# Patient Record
Sex: MALE | Race: WHITE | NOT HISPANIC OR LATINO | Employment: FULL TIME | URBAN - METROPOLITAN AREA
[De-identification: names, ages, dates, MRNs, and addresses within clinical notes are randomized per-mention and may not be internally consistent; named-entity substitution may affect disease eponyms.]

---

## 2018-05-04 ENCOUNTER — APPOINTMENT (OUTPATIENT)
Dept: CT IMAGING | Facility: HOSPITAL | Age: 58
End: 2018-05-04

## 2018-05-04 ENCOUNTER — APPOINTMENT (OUTPATIENT)
Dept: GENERAL RADIOLOGY | Facility: HOSPITAL | Age: 58
End: 2018-05-04

## 2018-05-04 ENCOUNTER — HOSPITAL ENCOUNTER (EMERGENCY)
Facility: HOSPITAL | Age: 58
Discharge: HOME | End: 2018-05-04
Attending: EMERGENCY MEDICINE | Admitting: EMERGENCY MEDICINE

## 2018-05-04 VITALS
RESPIRATION RATE: 18 BRPM | HEART RATE: 95 BPM | DIASTOLIC BLOOD PRESSURE: 79 MMHG | OXYGEN SATURATION: 99 % | BODY MASS INDEX: 27.77 KG/M2 | HEIGHT: 72 IN | SYSTOLIC BLOOD PRESSURE: 114 MMHG | WEIGHT: 205 LBS | TEMPERATURE: 98.4 F

## 2018-05-04 DIAGNOSIS — J35.1 ENLARGED TONSILS: ICD-10-CM

## 2018-05-04 DIAGNOSIS — Z86.711 HISTORY OF PULMONARY EMBOLISM: ICD-10-CM

## 2018-05-04 DIAGNOSIS — R07.9 CHEST PAIN, UNSPECIFIED TYPE: Primary | ICD-10-CM

## 2018-05-04 DIAGNOSIS — Z86.718 HISTORY OF DVT (DEEP VEIN THROMBOSIS): ICD-10-CM

## 2018-05-04 LAB
ALBUMIN SERPL-MCNC: 5 G/DL (ref 3.2–4.8)
ALBUMIN/GLOB SERPL: 1.6 G/DL (ref 1.5–2.5)
ALP SERPL-CCNC: 81 U/L (ref 25–100)
ALT SERPL W P-5'-P-CCNC: 39 U/L (ref 7–40)
ANION GAP SERPL CALCULATED.3IONS-SCNC: 12 MMOL/L (ref 3–11)
AST SERPL-CCNC: 30 U/L (ref 0–33)
BASOPHILS # BLD AUTO: 0.02 10*3/MM3 (ref 0–0.2)
BASOPHILS NFR BLD AUTO: 0.2 % (ref 0–1)
BILIRUB SERPL-MCNC: 0.6 MG/DL (ref 0.3–1.2)
BNP SERPL-MCNC: 2 PG/ML (ref 0–100)
BUN BLD-MCNC: 14 MG/DL (ref 9–23)
BUN/CREAT SERPL: 15.6 (ref 7–25)
CALCIUM SPEC-SCNC: 9.8 MG/DL (ref 8.7–10.4)
CHLORIDE SERPL-SCNC: 103 MMOL/L (ref 99–109)
CO2 SERPL-SCNC: 25 MMOL/L (ref 20–31)
CREAT BLD-MCNC: 0.9 MG/DL (ref 0.6–1.3)
DEPRECATED RDW RBC AUTO: 48.6 FL (ref 37–54)
EOSINOPHIL # BLD AUTO: 0.19 10*3/MM3 (ref 0–0.3)
EOSINOPHIL NFR BLD AUTO: 2.2 % (ref 0–3)
ERYTHROCYTE [DISTWIDTH] IN BLOOD BY AUTOMATED COUNT: 13.9 % (ref 11.3–14.5)
GFR SERPL CREATININE-BSD FRML MDRD: 87 ML/MIN/1.73
GLOBULIN UR ELPH-MCNC: 3.2 GM/DL
GLUCOSE BLD-MCNC: 110 MG/DL (ref 70–100)
HCT VFR BLD AUTO: 47.4 % (ref 38.9–50.9)
HGB BLD-MCNC: 16 G/DL (ref 13.1–17.5)
HOLD SPECIMEN: NORMAL
HOLD SPECIMEN: NORMAL
IMM GRANULOCYTES # BLD: 0.01 10*3/MM3 (ref 0–0.03)
IMM GRANULOCYTES NFR BLD: 0.1 % (ref 0–0.6)
LYMPHOCYTES # BLD AUTO: 2.72 10*3/MM3 (ref 0.6–4.8)
LYMPHOCYTES NFR BLD AUTO: 30.9 % (ref 24–44)
MCH RBC QN AUTO: 32.1 PG (ref 27–31)
MCHC RBC AUTO-ENTMCNC: 33.8 G/DL (ref 32–36)
MCV RBC AUTO: 95.2 FL (ref 80–99)
MONOCYTES # BLD AUTO: 0.72 10*3/MM3 (ref 0–1)
MONOCYTES NFR BLD AUTO: 8.2 % (ref 0–12)
NEUTROPHILS # BLD AUTO: 5.15 10*3/MM3 (ref 1.5–8.3)
NEUTROPHILS NFR BLD AUTO: 58.5 % (ref 41–71)
PLATELET # BLD AUTO: 241 10*3/MM3 (ref 150–450)
PMV BLD AUTO: 10.7 FL (ref 6–12)
POTASSIUM BLD-SCNC: 3.8 MMOL/L (ref 3.5–5.5)
PROT SERPL-MCNC: 8.2 G/DL (ref 5.7–8.2)
RBC # BLD AUTO: 4.98 10*6/MM3 (ref 4.2–5.76)
SODIUM BLD-SCNC: 140 MMOL/L (ref 132–146)
TROPONIN I SERPL-MCNC: 0 NG/ML (ref 0–0.07)
TROPONIN I SERPL-MCNC: 0 NG/ML (ref 0–0.07)
WBC NRBC COR # BLD: 8.8 10*3/MM3 (ref 3.5–10.8)
WHOLE BLOOD HOLD SPECIMEN: NORMAL
WHOLE BLOOD HOLD SPECIMEN: NORMAL

## 2018-05-04 PROCEDURE — 70491 CT SOFT TISSUE NECK W/DYE: CPT

## 2018-05-04 PROCEDURE — 84484 ASSAY OF TROPONIN QUANT: CPT

## 2018-05-04 PROCEDURE — 93005 ELECTROCARDIOGRAM TRACING: CPT | Performed by: EMERGENCY MEDICINE

## 2018-05-04 PROCEDURE — 96360 HYDRATION IV INFUSION INIT: CPT

## 2018-05-04 PROCEDURE — 93005 ELECTROCARDIOGRAM TRACING: CPT

## 2018-05-04 PROCEDURE — 0 IOPAMIDOL PER 1 ML: Performed by: EMERGENCY MEDICINE

## 2018-05-04 PROCEDURE — 80053 COMPREHEN METABOLIC PANEL: CPT

## 2018-05-04 PROCEDURE — 83880 ASSAY OF NATRIURETIC PEPTIDE: CPT

## 2018-05-04 PROCEDURE — 85025 COMPLETE CBC W/AUTO DIFF WBC: CPT

## 2018-05-04 PROCEDURE — 99283 EMERGENCY DEPT VISIT LOW MDM: CPT

## 2018-05-04 PROCEDURE — 71046 X-RAY EXAM CHEST 2 VIEWS: CPT

## 2018-05-04 PROCEDURE — 71275 CT ANGIOGRAPHY CHEST: CPT

## 2018-05-04 RX ORDER — SODIUM CHLORIDE 0.9 % (FLUSH) 0.9 %
10 SYRINGE (ML) INJECTION AS NEEDED
Status: DISCONTINUED | OUTPATIENT
Start: 2018-05-04 | End: 2018-05-04 | Stop reason: HOSPADM

## 2018-05-04 RX ADMIN — SODIUM CHLORIDE 1000 ML: 9 INJECTION, SOLUTION INTRAVENOUS at 18:45

## 2018-05-04 RX ADMIN — IOPAMIDOL 95 ML: 755 INJECTION, SOLUTION INTRAVENOUS at 19:02

## 2018-05-04 NOTE — ED PROVIDER NOTES
Subjective   Sudden onset of cp and soa. Hx of PEs and DVTs. Most recent PE was 5 years ago. He was on eliquis but took himself off 7 days ago as he thought it was causing his throat to swell. He reports warfarin did the same thing. Currently his throat feels fine. He has an apt with his pcp in the next several weeks for eval.            Chest Pain   Pain location:  Substernal area  Pain quality: sharp    Pain radiates to:  Does not radiate  Pain severity:  Moderate  Onset quality:  Sudden  Duration:  3 hours  Timing:  Constant  Progression:  Waxing and waning  Chronicity:  New  Context: breathing and movement    Relieved by:  Nothing  Ineffective treatments:  None tried  Associated symptoms: shortness of breath    Associated symptoms: no abdominal pain, no cough, no diaphoresis, no fever, no nausea and no vomiting        Review of Systems   Constitutional: Negative for diaphoresis and fever.   HENT: Negative for congestion and sore throat.    Respiratory: Positive for shortness of breath. Negative for cough, choking, chest tightness and wheezing.    Cardiovascular: Positive for chest pain. Negative for leg swelling.   Gastrointestinal: Negative for abdominal distention, abdominal pain, diarrhea, nausea and vomiting.   All other systems reviewed and are negative.      Past Medical History:   Diagnosis Date   • DVT (deep venous thrombosis)    • Kidney stone    • PE (pulmonary thromboembolism)        No Known Allergies    Past Surgical History:   Procedure Laterality Date   • BLADDER SURGERY     • KNEE SURGERY Left        History reviewed. No pertinent family history.    Social History     Social History   • Marital status:      Social History Main Topics   • Smoking status: Never Smoker   • Smokeless tobacco: Never Used   • Alcohol use No   • Drug use: No   • Sexual activity: Defer     Other Topics Concern   • Not on file           Objective   Physical Exam   Constitutional: He is oriented to person, place,  and time. He appears well-developed and well-nourished.   HENT:   Head: Normocephalic and atraumatic.   Right Ear: External ear normal.   Left Ear: External ear normal.   Nose: Nose normal.   Mouth/Throat: Oropharynx is clear and moist.   Eyes: Conjunctivae and EOM are normal. Pupils are equal, round, and reactive to light.   Neck: Normal range of motion. Neck supple.   Cardiovascular: Normal rate, regular rhythm, normal heart sounds and intact distal pulses.    Pulmonary/Chest: Effort normal and breath sounds normal.   Abdominal: Soft. Bowel sounds are normal.   Musculoskeletal: Normal range of motion. He exhibits no edema or tenderness.   Neurological: He is alert and oriented to person, place, and time.   Skin: Skin is warm and dry.   Psychiatric: He has a normal mood and affect. His behavior is normal. Judgment normal.       Procedures           ED Course  ED Course   Comment By Time   Neg CTs for PE or mass. He will restart his anticoagulation. I recommend earlier fu with his pcp and he will need an endoscopy for further eval of his throat problem.    He appears well. No cp or soa. Well aware of the ss of worsening condition. DALE Mendes 05/04 2026                CT Soft Tissue Neck With Contrast   Final Result   Findings may represent lingual tonsillitis. No evidence of palatine    tonsillitis.      No evidence of epiglottitis.      No evidence of retropharyngeal abscess.      THIS DOCUMENT HAS BEEN ELECTRONICALLY SIGNED BY TIM ASHTON MD      CT Angiogram Chest With Contrast   Final Result   No evidence of pulmonary embolism.      No consolidation, evidence of failure or suspicious mass.      Right lower lung atelectasis.      THIS DOCUMENT HAS BEEN ELECTRONICALLY SIGNED BY TIM ASHTON MD      XR Chest 2 View   Preliminary Result   Stable chest exam with hyperinflated lungs and mild chronic   appearing interstitial changes. No acute chest disease is seen.       D:  05/04/2018   E:  05/04/2018                      MDM      Final diagnoses:   Chest pain, unspecified type   Enlarged tonsils   History of pulmonary embolism   History of DVT (deep vein thrombosis)            DALE Mendes  05/04/18 2032       DALE Mendes  05/04/18 2032

## 2020-01-21 ENCOUNTER — HOSPITAL ENCOUNTER (EMERGENCY)
Facility: HOSPITAL | Age: 60
Discharge: HOME | End: 2020-01-21
Attending: EMERGENCY MEDICINE | Admitting: EMERGENCY MEDICINE

## 2020-01-21 VITALS
RESPIRATION RATE: 18 BRPM | SYSTOLIC BLOOD PRESSURE: 127 MMHG | BODY MASS INDEX: 29.12 KG/M2 | HEART RATE: 94 BPM | HEIGHT: 72 IN | OXYGEN SATURATION: 98 % | DIASTOLIC BLOOD PRESSURE: 79 MMHG | WEIGHT: 215 LBS | TEMPERATURE: 97.8 F

## 2020-01-21 DIAGNOSIS — E87.20 LACTIC ACIDOSIS: ICD-10-CM

## 2020-01-21 DIAGNOSIS — R20.2 PARESTHESIAS: Primary | ICD-10-CM

## 2020-01-21 LAB
ALBUMIN SERPL-MCNC: 4.8 G/DL (ref 3.5–5.2)
ALBUMIN/GLOB SERPL: 1.6 G/DL
ALP SERPL-CCNC: 64 U/L (ref 39–117)
ALT SERPL W P-5'-P-CCNC: 18 U/L (ref 1–41)
AMPHET+METHAMPHET UR QL: NEGATIVE
AMPHETAMINES UR QL: NEGATIVE
ANION GAP SERPL CALCULATED.3IONS-SCNC: 16 MMOL/L (ref 5–15)
ARTERIAL PATENCY WRIST A: NORMAL
AST SERPL-CCNC: 17 U/L (ref 1–40)
ATMOSPHERIC PRESS: ABNORMAL MM[HG]
ATMOSPHERIC PRESS: NORMAL MM[HG]
BARBITURATES UR QL SCN: NEGATIVE
BASE EXCESS BLDA CALC-SCNC: 0.6 MMOL/L (ref 0–2)
BASE EXCESS BLDV CALC-SCNC: 1.8 MMOL/L (ref -2–2)
BASOPHILS # BLD AUTO: 0.04 10*3/MM3 (ref 0–0.2)
BASOPHILS NFR BLD AUTO: 0.5 % (ref 0–1.5)
BDY SITE: ABNORMAL
BDY SITE: NORMAL
BENZODIAZ UR QL SCN: NEGATIVE
BILIRUB SERPL-MCNC: 0.5 MG/DL (ref 0.2–1.2)
BODY TEMPERATURE: 37 C
BODY TEMPERATURE: 37 C
BUN BLD-MCNC: 11 MG/DL (ref 8–23)
BUN/CREAT SERPL: 14.9 (ref 7–25)
BUPRENORPHINE SERPL-MCNC: NEGATIVE NG/ML
CALCIUM SPEC-SCNC: 9.8 MG/DL (ref 8.6–10.5)
CANNABINOIDS SERPL QL: NEGATIVE
CHLORIDE SERPL-SCNC: 103 MMOL/L (ref 98–107)
CK SERPL-CCNC: 101 U/L (ref 20–200)
CO2 BLDA-SCNC: 25.9 MMOL/L (ref 22–33)
CO2 BLDA-SCNC: 31.1 MMOL/L (ref 22–33)
CO2 SERPL-SCNC: 26 MMOL/L (ref 22–29)
COCAINE UR QL: NEGATIVE
COHGB MFR BLD: 0.9 %
COHGB MFR BLD: 1.1 % (ref 0–2)
CREAT BLD-MCNC: 0.74 MG/DL (ref 0.76–1.27)
D-LACTATE SERPL-SCNC: 1.4 MMOL/L (ref 0.5–2)
D-LACTATE SERPL-SCNC: 2.9 MMOL/L (ref 0.5–2)
DEPRECATED RDW RBC AUTO: 45.6 FL (ref 37–54)
EOSINOPHIL # BLD AUTO: 0.08 10*3/MM3 (ref 0–0.4)
EOSINOPHIL NFR BLD AUTO: 1.1 % (ref 0.3–6.2)
ERYTHROCYTE [DISTWIDTH] IN BLOOD BY AUTOMATED COUNT: 13.2 % (ref 12.3–15.4)
GFR SERPL CREATININE-BSD FRML MDRD: 108 ML/MIN/1.73
GLOBULIN UR ELPH-MCNC: 3 GM/DL
GLUCOSE BLD-MCNC: 105 MG/DL (ref 65–99)
HCO3 BLDA-SCNC: 24.8 MMOL/L (ref 20–26)
HCO3 BLDV-SCNC: 29.4 MMOL/L (ref 22–28)
HCT VFR BLD AUTO: 46.8 % (ref 37.5–51)
HCT VFR BLD CALC: 46.4 %
HGB BLD-MCNC: 15.6 G/DL (ref 13–17.7)
HGB BLDA-MCNC: 15.1 G/DL (ref 13.5–17.5)
HGB BLDA-MCNC: 16.3 G/DL (ref 13.5–17.5)
HOLD SPECIMEN: NORMAL
HOROWITZ INDEX BLD+IHG-RTO: 21 %
HOROWITZ INDEX BLD+IHG-RTO: 21 %
IMM GRANULOCYTES # BLD AUTO: 0.02 10*3/MM3 (ref 0–0.05)
IMM GRANULOCYTES NFR BLD AUTO: 0.3 % (ref 0–0.5)
LYMPHOCYTES # BLD AUTO: 1.8 10*3/MM3 (ref 0.7–3.1)
LYMPHOCYTES NFR BLD AUTO: 23.7 % (ref 19.6–45.3)
Lab: ABNORMAL
MCH RBC QN AUTO: 31.4 PG (ref 26.6–33)
MCHC RBC AUTO-ENTMCNC: 33.3 G/DL (ref 31.5–35.7)
MCV RBC AUTO: 94.2 FL (ref 79–97)
METHADONE UR QL SCN: NEGATIVE
METHGB BLD QL: 0.9 %
METHGB BLD QL: 1 % (ref 0–1.5)
MODALITY: ABNORMAL
MODALITY: NORMAL
MONOCYTES # BLD AUTO: 0.64 10*3/MM3 (ref 0.1–0.9)
MONOCYTES NFR BLD AUTO: 8.4 % (ref 5–12)
NEUTROPHILS # BLD AUTO: 5.03 10*3/MM3 (ref 1.7–7)
NEUTROPHILS NFR BLD AUTO: 66 % (ref 42.7–76)
NOTE: ABNORMAL
NOTE: NORMAL
NOTIFIED BY: ABNORMAL
NOTIFIED WHO: ABNORMAL
NRBC BLD AUTO-RTO: 0 /100 WBC (ref 0–0.2)
OPIATES UR QL: NEGATIVE
OXYCODONE UR QL SCN: NEGATIVE
OXYHGB MFR BLDV: 27.5 %
OXYHGB MFR BLDV: 95.4 % (ref 94–99)
PCO2 BLDA: 37.7 MM HG
PCO2 BLDV: 56.3 MM HG (ref 41–51)
PCO2 TEMP ADJ BLD: 37.7 MM HG (ref 35–48)
PCP UR QL SCN: NEGATIVE
PH BLDA: 7.43 PH UNITS (ref 7.35–7.45)
PH BLDV: 7.33 PH UNITS
PH, TEMP CORRECTED: 7.43 PH UNITS
PLATELET # BLD AUTO: 234 10*3/MM3 (ref 140–450)
PMV BLD AUTO: 10.2 FL (ref 6–12)
PO2 BLDA: 84 MM HG (ref 83–108)
PO2 BLDV: 18 MM HG (ref 27–53)
PO2 TEMP ADJ BLD: 84 MM HG (ref 83–108)
POTASSIUM BLD-SCNC: 4.3 MMOL/L (ref 3.5–5.2)
PROPOXYPH UR QL: NEGATIVE
PROT SERPL-MCNC: 7.8 G/DL (ref 6–8.5)
RBC # BLD AUTO: 4.97 10*6/MM3 (ref 4.14–5.8)
SODIUM BLD-SCNC: 145 MMOL/L (ref 136–145)
TRICYCLICS UR QL SCN: NEGATIVE
VENTILATOR MODE: ABNORMAL
VENTILATOR MODE: NORMAL
WBC NRBC COR # BLD: 7.61 10*3/MM3 (ref 3.4–10.8)

## 2020-01-21 PROCEDURE — 80053 COMPREHEN METABOLIC PANEL: CPT | Performed by: EMERGENCY MEDICINE

## 2020-01-21 PROCEDURE — 82820 HEMOGLOBIN-OXYGEN AFFINITY: CPT

## 2020-01-21 PROCEDURE — 36600 WITHDRAWAL OF ARTERIAL BLOOD: CPT

## 2020-01-21 PROCEDURE — 82805 BLOOD GASES W/O2 SATURATION: CPT

## 2020-01-21 PROCEDURE — 83605 ASSAY OF LACTIC ACID: CPT | Performed by: EMERGENCY MEDICINE

## 2020-01-21 PROCEDURE — 82550 ASSAY OF CK (CPK): CPT | Performed by: EMERGENCY MEDICINE

## 2020-01-21 PROCEDURE — 99284 EMERGENCY DEPT VISIT MOD MDM: CPT

## 2020-01-21 PROCEDURE — 93005 ELECTROCARDIOGRAM TRACING: CPT | Performed by: EMERGENCY MEDICINE

## 2020-01-21 PROCEDURE — 80306 DRUG TEST PRSMV INSTRMNT: CPT | Performed by: EMERGENCY MEDICINE

## 2020-01-21 PROCEDURE — 85025 COMPLETE CBC W/AUTO DIFF WBC: CPT | Performed by: EMERGENCY MEDICINE

## 2020-01-21 RX ORDER — SODIUM CHLORIDE 0.9 % (FLUSH) 0.9 %
10 SYRINGE (ML) INJECTION AS NEEDED
Status: DISCONTINUED | OUTPATIENT
Start: 2020-01-21 | End: 2020-01-21 | Stop reason: HOSPADM

## 2020-01-21 RX ADMIN — SODIUM CHLORIDE 1000 ML: 9 INJECTION, SOLUTION INTRAVENOUS at 02:29

## 2020-01-21 RX ADMIN — SODIUM CHLORIDE 1000 ML: 9 INJECTION, SOLUTION INTRAVENOUS at 05:25

## 2020-01-21 NOTE — ED PROVIDER NOTES
Subjective   Mr. Alo Yousif is a 59 y.o male presenting to the emergency department with complaints of numbness. He states he has been eating wild elderberries for the past several days. He started having fatigue in his bilateral arms and tingling in his fingers 6 days ago. He describes the sensation as tightness but denies pain. At first he thought he had a virus but his symptoms continued to worsen. The tightness began to move down from his shoulder and elbow to his lower arms and hands. He complains of difficulty typing and poor coordination involving putting his car key in the ignition. He complains of feeling overall jittery and anxious. He called poison control and was recommended to come here. He denies neck pain, diarrhea, vomiting, and numbness or tingling in his legs. There are no other acute symptoms at this time.       History provided by:  Patient  Other   Location:  Bilateral lower arms, hands, and fingers  Quality:  Numbness, tingling, tightness  Severity:  Moderate  Onset quality:  Sudden  Duration:  6 days  Timing:  Constant  Progression:  Worsening  Chronicity:  New  Relieved by:  None tried  Worsened by:  Nothing  Ineffective treatments:  None tried  Associated symptoms: fatigue (in arms)    Associated symptoms: no diarrhea and no vomiting        Review of Systems   Constitutional: Positive for fatigue (in arms).        Jittery   Gastrointestinal: Negative for diarrhea and vomiting.   Musculoskeletal: Negative for neck pain.   Neurological: Positive for numbness.        Numbness and tingling in bilateral lower arms and hands. Poor coordination.   Shoulder tightness and tingling: resolved. Elbow tightness and tingling: resolved.   No numbness or tingling in legs.    Psychiatric/Behavioral: The patient is nervous/anxious.    All other systems reviewed and are negative.      Past Medical History:   Diagnosis Date   • DVT (deep venous thrombosis) (CMS/[facility])    • Kidney stone    • PE (pulmonary  thromboembolism) (CMS/[facility]CC)        No Known Allergies    Past Surgical History:   Procedure Laterality Date   • BLADDER SURGERY     • KNEE SURGERY Left        No family history on file.    Social History     Socioeconomic History   • Marital status:      Spouse name: Not on file   • Number of children: Not on file   • Years of education: Not on file   • Highest education level: Not on file   Tobacco Use   • Smoking status: Never Smoker   • Smokeless tobacco: Never Used   Substance and Sexual Activity   • Alcohol use: No   • Drug use: No   • Sexual activity: Defer         Objective   Physical Exam   Constitutional: He is oriented to person, place, and time. He appears well-developed and well-nourished. No distress.   HENT:   Head: Normocephalic and atraumatic.   Eyes: Conjunctivae and EOM are normal. No scleral icterus.   Neck: Normal range of motion.   Cardiovascular: Regular rhythm. Tachycardia present. Exam reveals no gallop and no friction rub.   No murmur heard.  Pulmonary/Chest: Effort normal and breath sounds normal. No respiratory distress. He has no wheezes.   Abdominal: Soft. There is no tenderness.   Musculoskeletal: Normal range of motion. He exhibits no edema, tenderness or deformity.   Neurological: He is alert and oriented to person, place, and time.   No motor deficits appreciated   Skin: Skin is warm and dry. He is not diaphoretic.   Psychiatric: He has a normal mood and affect. His behavior is normal.   Anxious   Nursing note and vitals reviewed.      Procedures         ED Course     Recent Results (from the past 24 hour(s))   Urine Drug Screen - Urine, Clean Catch    Collection Time: 01/21/20  1:40 AM   Result Value Ref Range    THC, Screen, Urine Negative Negative    Phencyclidine (PCP), Urine Negative Negative    Cocaine Screen, Urine Negative Negative    Methamphetamine, Ur Negative Negative    Opiate Screen Negative Negative    Amphetamine Screen, Urine Negative Negative    Benzodiazepine  Screen, Urine Negative Negative    Tricyclic Antidepressants Screen Negative Negative    Methadone Screen, Urine Negative Negative    Barbiturates Screen, Urine Negative Negative    Oxycodone Screen, Urine Negative Negative    Propoxyphene Screen Negative Negative    Buprenorphine, Screen, Urine Negative Negative   Comprehensive Metabolic Panel    Collection Time: 01/21/20  1:53 AM   Result Value Ref Range    Glucose 105 (H) 65 - 99 mg/dL    BUN 11 8 - 23 mg/dL    Creatinine 0.74 (L) 0.76 - 1.27 mg/dL    Sodium 145 136 - 145 mmol/L    Potassium 4.3 3.5 - 5.2 mmol/L    Chloride 103 98 - 107 mmol/L    CO2 26.0 22.0 - 29.0 mmol/L    Calcium 9.8 8.6 - 10.5 mg/dL    Total Protein 7.8 6.0 - 8.5 g/dL    Albumin 4.80 3.50 - 5.20 g/dL    ALT (SGPT) 18 1 - 41 U/L    AST (SGOT) 17 1 - 40 U/L    Alkaline Phosphatase 64 39 - 117 U/L    Total Bilirubin 0.5 0.2 - 1.2 mg/dL    eGFR Non African Amer 108 >60 mL/min/1.73    Globulin 3.0 gm/dL    A/G Ratio 1.6 g/dL    BUN/Creatinine Ratio 14.9 7.0 - 25.0    Anion Gap 16.0 (H) 5.0 - 15.0 mmol/L   CBC Auto Differential    Collection Time: 01/21/20  1:53 AM   Result Value Ref Range    WBC 7.61 3.40 - 10.80 10*3/mm3    RBC 4.97 4.14 - 5.80 10*6/mm3    Hemoglobin 15.6 13.0 - 17.7 g/dL    Hematocrit 46.8 37.5 - 51.0 %    MCV 94.2 79.0 - 97.0 fL    MCH 31.4 26.6 - 33.0 pg    MCHC 33.3 31.5 - 35.7 g/dL    RDW 13.2 12.3 - 15.4 %    RDW-SD 45.6 37.0 - 54.0 fl    MPV 10.2 6.0 - 12.0 fL    Platelets 234 140 - 450 10*3/mm3    Neutrophil % 66.0 42.7 - 76.0 %    Lymphocyte % 23.7 19.6 - 45.3 %    Monocyte % 8.4 5.0 - 12.0 %    Eosinophil % 1.1 0.3 - 6.2 %    Basophil % 0.5 0.0 - 1.5 %    Immature Grans % 0.3 0.0 - 0.5 %    Neutrophils, Absolute 5.03 1.70 - 7.00 10*3/mm3    Lymphocytes, Absolute 1.80 0.70 - 3.10 10*3/mm3    Monocytes, Absolute 0.64 0.10 - 0.90 10*3/mm3    Eosinophils, Absolute 0.08 0.00 - 0.40 10*3/mm3    Basophils, Absolute 0.04 0.00 - 0.20 10*3/mm3    Immature Grans, Absolute 0.02  0.00 - 0.05 10*3/mm3    nRBC 0.0 0.0 - 0.2 /100 WBC   Lactic Acid, Plasma    Collection Time: 01/21/20  1:53 AM   Result Value Ref Range    Lactate 2.9 (C) 0.5 - 2.0 mmol/L   CK    Collection Time: 01/21/20  1:53 AM   Result Value Ref Range    Creatine Kinase 101 20 - 200 U/L   Blood Gas, Venous With Co-Ox    Collection Time: 01/21/20  1:56 AM   Result Value Ref Range    Site OTHER     pH, Venous 7.326 pH Units    pCO2, Venous 56.3 (H) 41.0 - 51.0 mm Hg    pO2, Venous 18.0 (L) 27.0 - 53.0 mm Hg    HCO3, Venous 29.4 (H) 22.0 - 28.0 mmol/L    Base Excess, Venous 1.8 -2.0 - 2.0 mmol/L    Hemoglobin, Blood Gas 16.3 13.5 - 17.5 g/dL    Oxyhemoglobin Venous 27.5 %    Methemoglobin Venous 0.9 %    Carboxyhemoglobin Venous 0.9 %    CO2 Content 31.1 22 - 33 mmol/L    Temperature 37.0 C    Barometric Pressure for Blood Gas      Modality Room Air     FIO2 21 %    Ventilator Mode       Note VBG RESULT     Notified Who CANDELARIO CARRERO. .     Notified By [id number]     Notified Time 01/21/2020 01:55    Blood Gas, Arterial With Co-Ox    Collection Time: 01/21/20  3:41 AM   Result Value Ref Range    Site Left Radial     Julien's Test N/A     pH, Arterial 7.426 7.350 - 7.450 pH units    pCO2, Arterial 37.7 mm Hg    pO2, Arterial 84.0 83.0 - 108.0 mm Hg    HCO3, Arterial 24.8 20.0 - 26.0 mmol/L    Base Excess, Arterial 0.6 0.0 - 2.0 mmol/L    Hemoglobin, Blood Gas 15.1 13.5 - 17.5 g/dL    Hematocrit, Blood Gas 46.4 %    Oxyhemoglobin 95.4 94 - 99 %    Methemoglobin 1.00 0.00 - 1.50 %    Carboxyhemoglobin 1.1 0 - 2 %    CO2 Content 25.9 22 - 33 mmol/L    Temperature 37.0 C    Barometric Pressure for Blood Gas      Modality Room Air     FIO2 21 %    Ventilator Mode       Note      pH, Temp Corrected 7.426 pH Units    pCO2, Temperature Corrected 37.7 35 - 48 mm Hg    pO2, Temperature Corrected 84.0 83 - 108 mm Hg     Note: In addition to lab results from this visit, the labs listed above may include labs taken at another facility or during a  different encounter within the last 24 hours. Please correlate lab times with ED admission and discharge times for further clarification of the services performed during this visit.    No orders to display     Vitals:    01/21/20 0100 01/21/20 0200 01/21/20 0231 01/21/20 0300   BP: 159/98 150/94 153/82 147/82   BP Location:       Patient Position:       Pulse: 94 111     Resp:       Temp:       TempSrc:       SpO2: 96% 97%     Weight:       Height:         Medications   sodium chloride 0.9 % flush 10 mL (has no administration in time range)   sodium chloride 0.9 % bolus 1,000 mL (1,000 mL Intravenous New Bag 1/21/20 0229)     ECG/EMG Results (last 24 hours)     Procedure Component Value Units Date/Time    ECG 12 Lead [[id number]] Collected:  01/21/20 0140     Updated:  01/21/20 0140        ECG 12 Lead           Pt felt significantly better following hydration.  He is comfortable with DC at this time.  After initial H and P he noted that he has gone from drinking 2 cups of coffee daily to 8-10 as his wife now makes it at home.  I recommneded that he go back to drinking 2 cups of coffee daily and discontinue the use of Elderberries.  He understands the etiology of his symptoms is not clear and he should have a low threshold to return to the ED if symptoms return of other concerns arise.              MDM    Final diagnoses:   Paresthesias   Lactic acidosis       Documentation assistance provided by linus Nguyen.  Information recorded by the scribsneha was done at my direction and has been verified and validated by me.     Sravanthi Nguyen  01/21/20 0221       Sravanthi Nguyen  01/21/20 0401       Sravanthi Nguyen  01/21/20 0402       Rashawn Brunner DO  01/25/20 0027

## 2020-04-08 ENCOUNTER — TELEMEDICINE (OUTPATIENT)
Dept: NEUROLOGY | Facility: CLINIC | Age: 60
End: 2020-04-08

## 2020-04-08 DIAGNOSIS — R20.2 PARESTHESIAS: Primary | ICD-10-CM

## 2020-04-08 PROCEDURE — 99203 OFFICE O/P NEW LOW 30 MIN: CPT | Performed by: PSYCHIATRY & NEUROLOGY

## 2020-04-08 NOTE — PROGRESS NOTES
Subjective:    CC: Alo Yousif is seen today in consultation for paresthesias in both the hands.    HPI:  This visit was done through video.  Patient is a 59-year-old male with past medical history of hypertension, DVT/PE started having bilateral hand and arm numbness associated with mild weakness and tremors of both hands back in January 2020.  He reports that prior to starting the symptoms, he was eating elderberry for approximately 6 days in his breakfast.  Initially it was thought to be related to the consumption of elderberry and he was instructed to go to ER for further evaluation.  While in ER, he underwent basic lab work which was essentially unremarkable.  He was treated with IV hydration which improved his symptoms and then was eventually discharged home with instruction to follow-up with outpatient neurology.  He reports that since the ER visit, symptoms have become less intense and less severe but it is still present.  He reports that he has stayed in an apartment for about 20 years where he was working from home and he would spend most of his time in his bedroom.  He is concerned that it may be related to some environmental exposure causing this ongoing symptoms.  He denies seeing any visible mold in his apartment.  Currently he has moved out of his apartment and is living with his son.  He denies any weakness in both his hands.  He reports that intermittently, he has experienced some tingling, numbness in both his feet as well.  He has been tested for diabetes and it was negative.  He denies any low back pain or neck pain.  He reports that he has been taking magnesium for some time now and it has helped improve some of his symptoms.  He also takes vitamin B12 1000 mcg daily.      The following portions of the patient's history were reviewed today and updated as of 04/08/2020  : allergies, social history and problem list.  This document will be scanned to patient's chart.      Current Outpatient  Medications:   •  apixaban (ELIQUIS) 5 MG tablet tablet, Take  by mouth 2 (Two) Times a Day. PT ELECTED TO STOP ELIQUIS 7 DAYS AGO WITHOUT CONSULTING PHYSICIAN, Disp: , Rfl:    Past Medical History:   Diagnosis Date   • DVT (deep venous thrombosis) (CMS/HCC)    • Kidney stone    • PE (pulmonary thromboembolism) (CMS/HCC)       Past Surgical History:   Procedure Laterality Date   • BLADDER SURGERY     • KNEE SURGERY Left       No family history on file.   Review of Systems   All other systems reviewed and are negative.      All other systems reviewed and are negative     Objective:    There were no vitals taken for this visit.    Neurology Exam:  General apperance: NAD.     Mental status: Alert, awake and oriented to time place and person.    Recent and Remote memory: Can recall 3/3 objects at 5 minutes. Can recall historical events.     Attention span and Concentration: Serial 7s: Normal.     Fund of knowledge:  Normal.     Language and Speech: No aphasia or dysarthria.    Naming , Repitition and Comprehension:  Can name objects, repeat a sentence and follow commands. Speech is clear and fluent with good repetition, comprehension, and naming.    Cranial Nerves:   Cranial nerves II to XII grossly intact.    Motor:  Moves all 4 extremities spontaneously and denies any weakness.    Sensory: Unable to assess.    DTRs: Unable to assess.    Babinski: Unable to assess.    Co-ordination: Normal finger-to-nose.    Rhomberg: Negative.    Gait: Normal.    Cardiovascular: Unable to assess.    Ophthalmoscopic exam: Unable to assess.  Assessment and Plan:  1. Paresthesias  Possible neuropathy based on patient's symptoms.  I am going to schedule him for EMG/nerve conduction study for further evaluation.  He will need thorough neurological examination once I am able to see him in clinic in person for further evaluation as well.  If EMG/nerve conduction studies positive for neuropathy then plan is to do detailed neuropathy work-up.   He is concerned about possible environmental exposure causing his ongoing symptoms so I advised him that it is better for him to move out of his current apartment and moving to a new apartment.  Continue with magnesium and vitamin B12 as it has helped somewhat.  I plan to see him back in clinic in 8 weeks for further evaluation and to go over the results of EMG/nerve conduction test.      This visit was completed  Through video and total time spent was 30 minutes.    No follow-ups on file.     Johann Kramer MD

## 2020-04-26 ENCOUNTER — APPOINTMENT (OUTPATIENT)
Dept: CT IMAGING | Facility: HOSPITAL | Age: 60
End: 2020-04-26

## 2020-04-26 ENCOUNTER — HOSPITAL ENCOUNTER (EMERGENCY)
Facility: HOSPITAL | Age: 60
Discharge: HOME | End: 2020-04-26
Attending: EMERGENCY MEDICINE | Admitting: EMERGENCY MEDICINE

## 2020-04-26 VITALS
OXYGEN SATURATION: 97 % | SYSTOLIC BLOOD PRESSURE: 124 MMHG | DIASTOLIC BLOOD PRESSURE: 81 MMHG | HEIGHT: 72 IN | HEART RATE: 87 BPM | TEMPERATURE: 99.7 F | BODY MASS INDEX: 26.01 KG/M2 | RESPIRATION RATE: 14 BRPM | WEIGHT: 192 LBS

## 2020-04-26 DIAGNOSIS — Z20.822 SUSPECTED COVID-19 VIRUS INFECTION: ICD-10-CM

## 2020-04-26 DIAGNOSIS — R00.0 INAPPROPRIATE SINUS TACHYCARDIA: ICD-10-CM

## 2020-04-26 DIAGNOSIS — R06.00 DYSPNEA, UNSPECIFIED TYPE: Primary | ICD-10-CM

## 2020-04-26 LAB
ALBUMIN SERPL-MCNC: 4.7 G/DL (ref 3.5–5.2)
ALBUMIN/GLOB SERPL: 1.5 G/DL
ALP SERPL-CCNC: 61 U/L (ref 39–117)
ALT SERPL W P-5'-P-CCNC: 29 U/L (ref 1–41)
ANION GAP SERPL CALCULATED.3IONS-SCNC: 14 MMOL/L (ref 5–15)
AST SERPL-CCNC: 36 U/L (ref 1–40)
B PARAPERT DNA SPEC QL NAA+PROBE: NOT DETECTED
B PERT DNA SPEC QL NAA+PROBE: NOT DETECTED
BASOPHILS # BLD AUTO: 0.03 10*3/MM3 (ref 0–0.2)
BASOPHILS NFR BLD AUTO: 0.4 % (ref 0–1.5)
BILIRUB SERPL-MCNC: 0.7 MG/DL (ref 0.2–1.2)
BUN BLD-MCNC: 8 MG/DL (ref 8–23)
BUN/CREAT SERPL: 10.1 (ref 7–25)
C PNEUM DNA NPH QL NAA+NON-PROBE: NOT DETECTED
CALCIUM SPEC-SCNC: 9.7 MG/DL (ref 8.6–10.5)
CHLORIDE SERPL-SCNC: 101 MMOL/L (ref 98–107)
CO2 SERPL-SCNC: 25 MMOL/L (ref 22–29)
CREAT BLD-MCNC: 0.79 MG/DL (ref 0.76–1.27)
D DIMER PPP FEU-MCNC: 0.36 MCGFEU/ML (ref 0–0.56)
D-LACTATE SERPL-SCNC: 1.3 MMOL/L (ref 0.5–2)
DEPRECATED RDW RBC AUTO: 47.5 FL (ref 37–54)
EOSINOPHIL # BLD AUTO: 0.1 10*3/MM3 (ref 0–0.4)
EOSINOPHIL NFR BLD AUTO: 1.4 % (ref 0.3–6.2)
ERYTHROCYTE [DISTWIDTH] IN BLOOD BY AUTOMATED COUNT: 13.6 % (ref 12.3–15.4)
ERYTHROCYTE [SEDIMENTATION RATE] IN BLOOD: 9 MM/HR (ref 0–20)
FERRITIN SERPL-MCNC: 418.7 NG/ML (ref 30–400)
FLUAV H1 2009 PAND RNA NPH QL NAA+PROBE: NOT DETECTED
FLUAV H1 HA GENE NPH QL NAA+PROBE: NOT DETECTED
FLUAV H3 RNA NPH QL NAA+PROBE: NOT DETECTED
FLUAV SUBTYP SPEC NAA+PROBE: NOT DETECTED
FLUBV RNA ISLT QL NAA+PROBE: NOT DETECTED
GFR SERPL CREATININE-BSD FRML MDRD: 100 ML/MIN/1.73
GLOBULIN UR ELPH-MCNC: 3.2 GM/DL
GLUCOSE BLD-MCNC: 116 MG/DL (ref 65–99)
HADV DNA SPEC NAA+PROBE: NOT DETECTED
HCOV 229E RNA SPEC QL NAA+PROBE: NOT DETECTED
HCOV HKU1 RNA SPEC QL NAA+PROBE: NOT DETECTED
HCOV NL63 RNA SPEC QL NAA+PROBE: NOT DETECTED
HCOV OC43 RNA SPEC QL NAA+PROBE: NOT DETECTED
HCT VFR BLD AUTO: 48.8 % (ref 37.5–51)
HGB BLD-MCNC: 16.6 G/DL (ref 13–17.7)
HMPV RNA NPH QL NAA+NON-PROBE: NOT DETECTED
HOLD SPECIMEN: NORMAL
HOLD SPECIMEN: NORMAL
HPIV1 RNA SPEC QL NAA+PROBE: NOT DETECTED
HPIV2 RNA SPEC QL NAA+PROBE: NOT DETECTED
HPIV3 RNA NPH QL NAA+PROBE: NOT DETECTED
HPIV4 P GENE NPH QL NAA+PROBE: NOT DETECTED
IMM GRANULOCYTES # BLD AUTO: 0.02 10*3/MM3 (ref 0–0.05)
IMM GRANULOCYTES NFR BLD AUTO: 0.3 % (ref 0–0.5)
LDH SERPL-CCNC: 209 U/L (ref 135–225)
LYMPHOCYTES # BLD AUTO: 1.82 10*3/MM3 (ref 0.7–3.1)
LYMPHOCYTES NFR BLD AUTO: 26.1 % (ref 19.6–45.3)
M PNEUMO IGG SER IA-ACNC: NOT DETECTED
MAGNESIUM SERPL-MCNC: 2.3 MG/DL (ref 1.6–2.4)
MCH RBC QN AUTO: 32.1 PG (ref 26.6–33)
MCHC RBC AUTO-ENTMCNC: 34 G/DL (ref 31.5–35.7)
MCV RBC AUTO: 94.4 FL (ref 79–97)
MONOCYTES # BLD AUTO: 0.46 10*3/MM3 (ref 0.1–0.9)
MONOCYTES NFR BLD AUTO: 6.6 % (ref 5–12)
NEUTROPHILS # BLD AUTO: 4.54 10*3/MM3 (ref 1.7–7)
NEUTROPHILS NFR BLD AUTO: 65.2 % (ref 42.7–76)
NRBC BLD AUTO-RTO: 0 /100 WBC (ref 0–0.2)
NT-PROBNP SERPL-MCNC: 48.5 PG/ML (ref 5–900)
PLATELET # BLD AUTO: 239 10*3/MM3 (ref 140–450)
PMV BLD AUTO: 10.8 FL (ref 6–12)
POTASSIUM BLD-SCNC: 3.9 MMOL/L (ref 3.5–5.2)
PROT SERPL-MCNC: 7.9 G/DL (ref 6–8.5)
RBC # BLD AUTO: 5.17 10*6/MM3 (ref 4.14–5.8)
RHINOVIRUS RNA SPEC NAA+PROBE: NOT DETECTED
RSV RNA NPH QL NAA+NON-PROBE: NOT DETECTED
SODIUM BLD-SCNC: 140 MMOL/L (ref 136–145)
TROPONIN T SERPL-MCNC: <0.01 NG/ML (ref 0–0.03)
TSH SERPL DL<=0.05 MIU/L-ACNC: 2.43 UIU/ML (ref 0.27–4.2)
WBC NRBC COR # BLD: 6.97 10*3/MM3 (ref 3.4–10.8)
WHOLE BLOOD HOLD SPECIMEN: NORMAL
WHOLE BLOOD HOLD SPECIMEN: NORMAL

## 2020-04-26 PROCEDURE — 83880 ASSAY OF NATRIURETIC PEPTIDE: CPT | Performed by: EMERGENCY MEDICINE

## 2020-04-26 PROCEDURE — 85652 RBC SED RATE AUTOMATED: CPT | Performed by: EMERGENCY MEDICINE

## 2020-04-26 PROCEDURE — U0003 INFECTIOUS AGENT DETECTION BY NUCLEIC ACID (DNA OR RNA); SEVERE ACUTE RESPIRATORY SYNDROME CORONAVIRUS 2 (SARS-COV-2) (CORONAVIRUS DISEASE [COVID-19]), AMPLIFIED PROBE TECHNIQUE, MAKING USE OF HIGH THROUGHPUT TECHNOLOGIES AS DESCRIBED BY CMS-2020-01-R: HCPCS | Performed by: EMERGENCY MEDICINE

## 2020-04-26 PROCEDURE — U0002 COVID-19 LAB TEST NON-CDC: HCPCS | Performed by: EMERGENCY MEDICINE

## 2020-04-26 PROCEDURE — 96361 HYDRATE IV INFUSION ADD-ON: CPT

## 2020-04-26 PROCEDURE — 83615 LACTATE (LD) (LDH) ENZYME: CPT | Performed by: EMERGENCY MEDICINE

## 2020-04-26 PROCEDURE — 93005 ELECTROCARDIOGRAM TRACING: CPT | Performed by: EMERGENCY MEDICINE

## 2020-04-26 PROCEDURE — 0 IOPAMIDOL PER 1 ML: Performed by: EMERGENCY MEDICINE

## 2020-04-26 PROCEDURE — 0100U HC BIOFIRE FILMARRAY RESP PANEL 2: CPT | Performed by: EMERGENCY MEDICINE

## 2020-04-26 PROCEDURE — 83735 ASSAY OF MAGNESIUM: CPT | Performed by: EMERGENCY MEDICINE

## 2020-04-26 PROCEDURE — 80053 COMPREHEN METABOLIC PANEL: CPT | Performed by: EMERGENCY MEDICINE

## 2020-04-26 PROCEDURE — 84443 ASSAY THYROID STIM HORMONE: CPT | Performed by: EMERGENCY MEDICINE

## 2020-04-26 PROCEDURE — 83605 ASSAY OF LACTIC ACID: CPT | Performed by: EMERGENCY MEDICINE

## 2020-04-26 PROCEDURE — 82728 ASSAY OF FERRITIN: CPT | Performed by: EMERGENCY MEDICINE

## 2020-04-26 PROCEDURE — 84484 ASSAY OF TROPONIN QUANT: CPT | Performed by: EMERGENCY MEDICINE

## 2020-04-26 PROCEDURE — 71275 CT ANGIOGRAPHY CHEST: CPT

## 2020-04-26 PROCEDURE — 85379 FIBRIN DEGRADATION QUANT: CPT | Performed by: EMERGENCY MEDICINE

## 2020-04-26 PROCEDURE — 85025 COMPLETE CBC W/AUTO DIFF WBC: CPT | Performed by: EMERGENCY MEDICINE

## 2020-04-26 PROCEDURE — 96374 THER/PROPH/DIAG INJ IV PUSH: CPT

## 2020-04-26 PROCEDURE — 99284 EMERGENCY DEPT VISIT MOD MDM: CPT

## 2020-04-26 RX ORDER — METOPROLOL SUCCINATE 50 MG/1
50 TABLET, EXTENDED RELEASE ORAL DAILY
Qty: 30 TABLET | Refills: 0 | Status: SHIPPED | OUTPATIENT
Start: 2020-04-26

## 2020-04-26 RX ORDER — SODIUM CHLORIDE 0.9 % (FLUSH) 0.9 %
10 SYRINGE (ML) INJECTION AS NEEDED
Status: DISCONTINUED | OUTPATIENT
Start: 2020-04-26 | End: 2020-04-26 | Stop reason: HOSPADM

## 2020-04-26 RX ORDER — METOPROLOL TARTRATE 5 MG/5ML
2.5 INJECTION INTRAVENOUS
Status: DISCONTINUED | OUTPATIENT
Start: 2020-04-26 | End: 2020-04-26 | Stop reason: HOSPADM

## 2020-04-26 RX ADMIN — IOPAMIDOL 95 ML: 755 INJECTION, SOLUTION INTRAVENOUS at 11:25

## 2020-04-26 RX ADMIN — SODIUM CHLORIDE 2613 ML: 9 INJECTION, SOLUTION INTRAVENOUS at 10:48

## 2020-04-26 RX ADMIN — METOPROLOL TARTRATE 2.5 MG: 5 INJECTION INTRAVENOUS at 10:47

## 2020-04-26 NOTE — ED PROVIDER NOTES
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  He sees Jorge Goncalves is his primary care doctor.  Currently takes no prescription medications and just takes some health food supplements with tumor rec and cezar.    Comes the emergency room today with dyspnea and feeling like he cannot take a deep breath in.  This is been present for 3 weeks and getting worse.  About 2 to 3 weeks ago he was able to walk 10 miles without difficulty a few days ago is able to walk 5 miles but it was more difficult for him over the past couple days just at rest he feels like he cannot take a deep breath in and feels dyspneic.    Only time he is ever felt this way in the past was when he was on sertraline.  He stopped that and his symptoms improved.    He had pulmonary embolus in 2013 and has not been tested for clotting disorders and it was presumed it was due to 1 of multiple right knee surgeries he had.  He took himself off this in February because he was feeling fatigued and he felt better.  He was also have paresthesias in his arms that time was seen here in the ER.  His ER evaluation was negative and he followed up with Dr. Rodriguez via teleconference and is scheduled for NCV's and EMGs which have not been performed yet.    He does check his pulse and he is noticed that is been elevated recently.  He has had no history of tacky arrhythmias that he knows of but did had a Holter a few years ago that was okay as far as he remembers.  He may have had an echo at that time to was presumptively diagnosed with a coxsackie infection though it does not sound like he had a depressed ejection fraction.  He has no history of coronary artery disease.    He has had about a 20 pound weight loss in the past few months but he reports he really has been watching what he eats.    He has sleep apnea and is quite rigorous in the use of his CPAP.    He has had no  peripheral edema.  He has had occasional cough.  Reports no fevers or chills.  He has had no peripheral edema or rash.  Bowel movements and urination have been normal and he is not passed blood per any orifice.    All other systems are reviewed and are negative except as noted above.          Review of Systems   All other systems reviewed and are negative.      Past Medical History:   Diagnosis Date   • DVT (deep venous thrombosis) (CMS/HCC)    • Kidney stone    • PE (pulmonary thromboembolism) (CMS/[facility])        No Known Allergies    Past Surgical History:   Procedure Laterality Date   • BLADDER SURGERY     • KNEE SURGERY Left        History reviewed. No pertinent family history.    Social History     Socioeconomic History   • Marital status:      Spouse name: Not on file   • Number of children: Not on file   • Years of education: Not on file   • Highest education level: Not on file   Tobacco Use   • Smoking status: Never Smoker   • Smokeless tobacco: Never Used   Substance and Sexual Activity   • Alcohol use: No   • Drug use: No   • Sexual activity: Defer           Objective   Physical Exam   Constitutional: He appears well-developed and well-nourished.   This is a middle-aged man in no acute distress.  He has little bit of tachypnea and is little tachycardic his oxygen saturation is 99 100% on room air.  He is in no distress.  In speaking complete sentences.   HENT:   Head: Normocephalic and atraumatic.   Right Ear: External ear normal.   Left Ear: External ear normal.   Nose: Nose normal.   Mouth/Throat: Oropharynx is clear and moist.   Eyes: Pupils are equal, round, and reactive to light. Conjunctivae and EOM are normal.   Neck: Normal range of motion. Neck supple. No JVD present. No tracheal deviation present. No thyromegaly present.   Cardiovascular:   No murmur heard.  Tachycardic but regular without murmurs rubs gallops or heaves.  Axilla has no masses.   Pulmonary/Chest: Effort normal and breath  sounds normal.   Mild tachypnea but I do not hear wheezes or crackles.   Abdominal: Soft. Bowel sounds are normal. He exhibits no distension and no mass. There is no tenderness. There is no guarding.   Musculoskeletal: Normal range of motion. He exhibits no edema.   Is got good range of motion of all his extremities.  Is got no synovitis, rash, or edema.  Got good pulses in his extremities.  There are no venous cords.   Lymphadenopathy:     He has no cervical adenopathy.   Neurological:   He is alert and oriented no acute distress.  His face is symmetric, his voice is strong, his tongue is midline.  Vision, hearing, and speech are all preserved.  He has good motor strength in his knee jerk reflexes are 2+.  He no longer has paresthesias in his upper extremities.   Skin: Skin is warm and dry. Capillary refill takes less than 2 seconds.   Psychiatric: He has a normal mood and affect. His behavior is normal. Judgment and thought content normal.   Nursing note and vitals reviewed.      Procedures           ED Course      Recent Results (from the past 24 hour(s))   Comprehensive Metabolic Panel    Collection Time: 04/26/20 10:14 AM   Result Value Ref Range    Glucose 116 (H) 65 - 99 mg/dL    BUN 8 8 - 23 mg/dL    Creatinine 0.79 0.76 - 1.27 mg/dL    Sodium 140 136 - 145 mmol/L    Potassium 3.9 3.5 - 5.2 mmol/L    Chloride 101 98 - 107 mmol/L    CO2 25.0 22.0 - 29.0 mmol/L    Calcium 9.7 8.6 - 10.5 mg/dL    Total Protein 7.9 6.0 - 8.5 g/dL    Albumin 4.70 3.50 - 5.20 g/dL    ALT (SGPT) 29 1 - 41 U/L    AST (SGOT) 36 1 - 40 U/L    Alkaline Phosphatase 61 39 - 117 U/L    Total Bilirubin 0.7 0.2 - 1.2 mg/dL    eGFR Non African Amer 100 >60 mL/min/1.73    Globulin 3.2 gm/dL    A/G Ratio 1.5 g/dL    BUN/Creatinine Ratio 10.1 7.0 - 25.0    Anion Gap 14.0 5.0 - 15.0 mmol/L   BNP    Collection Time: 04/26/20 10:14 AM   Result Value Ref Range    proBNP 48.5 5.0 - 900.0 pg/mL   Troponin    Collection Time: 04/26/20 10:14 AM    Result Value Ref Range    Troponin T <0.010 0.000 - 0.030 ng/mL   Light Blue Top    Collection Time: 04/26/20 10:14 AM   Result Value Ref Range    Extra Tube     Green Top (Gel)    Collection Time: 04/26/20 10:14 AM   Result Value Ref Range    Extra Tube Hold for add-ons.    Lavender Top    Collection Time: 04/26/20 10:14 AM   Result Value Ref Range    Extra Tube hold for add-on    Gold Top - SST    Collection Time: 04/26/20 10:14 AM   Result Value Ref Range    Extra Tube Hold for add-ons.    CBC Auto Differential    Collection Time: 04/26/20 10:14 AM   Result Value Ref Range    WBC 6.97 3.40 - 10.80 10*3/mm3    RBC 5.17 4.14 - 5.80 10*6/mm3    Hemoglobin 16.6 13.0 - 17.7 g/dL    Hematocrit 48.8 37.5 - 51.0 %    MCV 94.4 79.0 - 97.0 fL    MCH 32.1 26.6 - 33.0 pg    MCHC 34.0 31.5 - 35.7 g/dL    RDW 13.6 12.3 - 15.4 %    RDW-SD 47.5 37.0 - 54.0 fl    MPV 10.8 6.0 - 12.0 fL    Platelets 239 140 - 450 10*3/mm3    Neutrophil % 65.2 42.7 - 76.0 %    Lymphocyte % 26.1 19.6 - 45.3 %    Monocyte % 6.6 5.0 - 12.0 %    Eosinophil % 1.4 0.3 - 6.2 %    Basophil % 0.4 0.0 - 1.5 %    Immature Grans % 0.3 0.0 - 0.5 %    Neutrophils, Absolute 4.54 1.70 - 7.00 10*3/mm3    Lymphocytes, Absolute 1.82 0.70 - 3.10 10*3/mm3    Monocytes, Absolute 0.46 0.10 - 0.90 10*3/mm3    Eosinophils, Absolute 0.10 0.00 - 0.40 10*3/mm3    Basophils, Absolute 0.03 0.00 - 0.20 10*3/mm3    Immature Grans, Absolute 0.02 0.00 - 0.05 10*3/mm3    nRBC 0.0 0.0 - 0.2 /100 WBC   D-dimer, Quantitative    Collection Time: 04/26/20 10:14 AM   Result Value Ref Range    D-Dimer, Quantitative 0.36 0.00 - 0.56 MCGFEU/mL   TSH    Collection Time: 04/26/20 10:14 AM   Result Value Ref Range    TSH 2.430 0.270 - 4.200 uIU/mL   Magnesium    Collection Time: 04/26/20 10:14 AM   Result Value Ref Range    Magnesium 2.3 1.6 - 2.4 mg/dL   Lactate Dehydrogenase    Collection Time: 04/26/20 10:14 AM   Result Value Ref Range     135 - 225 U/L   Ferritin    Collection  Time: 04/26/20 10:14 AM   Result Value Ref Range    Ferritin 418.70 (H) 30.00 - 400.00 ng/mL   Sedimentation Rate    Collection Time: 04/26/20 10:14 AM   Result Value Ref Range    Sed Rate 9 0 - 20 mm/hr   Respiratory Panel, PCR - Swab, Nasopharynx    Collection Time: 04/26/20 10:51 AM   Result Value Ref Range    ADENOVIRUS, PCR Not Detected Not Detected    Coronavirus 229E Not Detected Not Detected    Coronavirus HKU1 Not Detected Not Detected    Coronavirus NL63 Not Detected Not Detected    Coronavirus OC43 Not Detected Not Detected    Human Metapneumovirus Not Detected Not Detected    Human Rhinovirus/Enterovirus Not Detected Not Detected    Influenza B PCR Not Detected Not Detected    Parainfluenza Virus 1 Not Detected Not Detected    Parainfluenza Virus 2 Not Detected Not Detected    Parainfluenza Virus 3 Not Detected Not Detected    Parainfluenza Virus 4 Not Detected Not Detected    Bordetella pertussis pcr Not Detected Not Detected    Influenza A H1 2009 PCR Not Detected Not Detected    Chlamydophila pneumoniae PCR Not Detected Not Detected    Mycoplasma pneumo by PCR Not Detected Not Detected    Influenza A PCR Not Detected Not Detected    Influenza A H3 Not Detected Not Detected    Influenza A H1 Not Detected Not Detected    RSV, PCR Not Detected Not Detected    Bordetella parapertussis PCR Not Detected Not Detected   Lactic Acid, Plasma    Collection Time: 04/26/20 10:51 AM   Result Value Ref Range    Lactate 1.3 0.5 - 2.0 mmol/L     Note: In addition to lab results from this visit, the labs listed above may include labs taken at another facility or during a different encounter within the last 24 hours. Please correlate lab times with ED admission and discharge times for further clarification of the services performed during this visit.    CT Angiogram Chest   Preliminary Result   1. No PE.   2. No acute intrathoracic process. Negative for pneumonia.       DICTATED:   04/26/2020   EDITED/ls :   04/26/2020              Vitals:    04/26/20 1145 04/26/20 1200 04/26/20 1214 04/26/20 1230   BP:  142/91  126/86   BP Location:  Right arm  Right arm   Patient Position:    Lying   Pulse: 85 85 96 96   Resp:  16  16   Temp:       TempSrc:       SpO2: 99% 97% 97% 100%   Weight:       Height:         Medications   sodium chloride 0.9 % flush 10 mL (has no administration in time range)   metoprolol tartrate (LOPRESSOR) injection 2.5 mg (2.5 mg Intravenous Given 4/26/20 1047)   sodium chloride 0.9 % bolus 2,613 mL (2,613 mL Intravenous New Bag 4/26/20 1048)   iopamidol (ISOVUE-370) 76 % injection 95 mL (95 mL Intravenous Given 4/26/20 1125)     ECG/EMG Results (last 24 hours)     Procedure Component Value Units Date/Time    ECG 12 Lead [[id number]] Collected:  04/26/20 1006     Updated:  04/26/20 1007        ECG 12 Lead   Final Result   Test Reason : SOA Protocol   Blood Pressure : **/** mmHG   Vent. Rate : 116 BPM     Atrial Rate : 116 BPM      P-R Int : 136 ms          QRS Dur : 088 ms       QT Int : 320 ms       P-R-T Axes : 075 006 067 degrees      QTc Int : 444 ms      Sinus tachycardia   Otherwise normal ECG   When compared with ECG of 21-JAN-2020 01:40,   No significant change was found   Confirmed by SUSANA LOZADA MD ([id number]) on 4/26/2020 12:36:57 PM      Referred By:  KIERRA           Confirmed By:SUSANA LOZADA MD                                             MDM  Number of Diagnoses or Management Options  Dyspnea, unspecified type:   Inappropriate sinus tachycardia:   Suspected Covid-19 Virus Infection:   Diagnosis management comments:       I reviewed all available studies at the bedside with the patient.  His labs are actually quite reassuring.  His d-dimer is normal.  His ferritin is just slightly elevated.  His thyroid functions and cardiac markers and other chemistries are all reassuring.    The CTA of his chest is also reassuring.  His cardiac silhouette appears normal.  He has no pulmonary embolus no  infiltrates.    In the ER give the patient some IV Lopressor and his heart rate was in the lower 90s.  He really did not feel significantly different with this.  I think I can think that he has some inappropriate sinus tachycardia.  He is never been diagnosed with this in the past and has no history of arrhythmia.  He is reasonable to start him on low-dose beta-blocker and refer him to the arrhythmia clinic as I think he may need further evaluation including Holter and perhaps echo.  I did send a Coban test on the patient and this is pending though I think his risk of COVID is quite low given the paucity of other findings.    Also have him follow-up with his primary care doctor to help coordinate his care.  He will return to the ER if worse in any way.    All are agreeable with the plan       Amount and/or Complexity of Data Reviewed  Clinical lab tests: reviewed  Tests in the radiology section of CPT®: reviewed  Tests in the medicine section of CPT®: reviewed        Final diagnoses:   Dyspnea, unspecified type   Inappropriate sinus tachycardia   Suspected Covid-19 Virus Infection            Alo Alaniz MD  04/26/20 1300

## 2020-04-27 ENCOUNTER — EPISODE CHANGES (OUTPATIENT)
Dept: CASE MANAGEMENT | Facility: OTHER | Age: 60
End: 2020-04-27

## 2020-04-27 LAB
REF LAB TEST METHOD: NORMAL
SARS-COV-2 RNA RESP QL NAA+PROBE: NOT DETECTED

## 2020-04-29 ENCOUNTER — TELEPHONE (OUTPATIENT)
Dept: CARDIOLOGY | Facility: HOSPITAL | Age: 60
End: 2020-04-29

## 2020-04-29 NOTE — TELEPHONE ENCOUNTER
Patient was referred to Heart and Vascular by [facility] ER. Several attempts have been made to contact pt with no success. Letter was sent to patient to call the office to schedule.

## 2020-05-03 ENCOUNTER — EPISODE CHANGES (OUTPATIENT)
Dept: CASE MANAGEMENT | Facility: OTHER | Age: 60
End: 2020-05-03

## 2020-05-19 ENCOUNTER — HOSPITAL ENCOUNTER (OUTPATIENT)
Dept: NEUROLOGY | Facility: HOSPITAL | Age: 60
Discharge: HOME | End: 2020-05-19
Admitting: PSYCHIATRY & NEUROLOGY

## 2020-05-19 DIAGNOSIS — R20.2 PARESTHESIAS: ICD-10-CM

## 2020-05-19 PROCEDURE — 95910 NRV CNDJ TEST 7-8 STUDIES: CPT

## 2020-05-19 PROCEDURE — 95886 MUSC TEST DONE W/N TEST COMP: CPT

## 2020-05-20 ENCOUNTER — TELEPHONE (OUTPATIENT)
Dept: NEUROLOGY | Facility: CLINIC | Age: 60
End: 2020-05-20

## 2020-05-20 NOTE — TELEPHONE ENCOUNTER
EMG/nerve conduction study shows only mild pinched nerve at the left elbow.  Most importantly, there is no evidence of generalized neuropathy or muscle disease on this test.

## 2020-05-20 NOTE — TELEPHONE ENCOUNTER
PT CALLED AND WAS LAST SEEN BY DR WASHBURN ON 4/8/20. HE IS REQUESTING TO CHANGE PROVIDERS AND CONTINUE HIS CARE WITH DR. WELDON BECAUSE HE STATES HE IS HAVING MEMORY ISSUES. HE STATES HE HAD AN MRI AND A NERVE STUDY DONE YESTERDAY AND HE SAID HIS MEMORY IS DECLINING. PLEASE ADVISE          BEST CALL BACK- [phone]

## 2020-05-20 NOTE — TELEPHONE ENCOUNTER
PT CALLED TO REQUEST RESULTS OF EMG 5-19-20.    PLEASE CALL: [phone]; PLEASE LEAVE VM IF NO ANSWER.

## 2020-05-21 ENCOUNTER — TELEPHONE (OUTPATIENT)
Dept: NEUROLOGY | Facility: CLINIC | Age: 60
End: 2020-05-21

## 2020-05-21 NOTE — TELEPHONE ENCOUNTER
----- Message from Reinaldo Weldon MD sent at 5/21/2020 12:21 PM EDT -----  I looked through his records, and I don't feel I am the best person to help him. If he needs further evaluation of memory, I would recommend [facility]. I'm sorry.    ----- Message -----  From: Renata Melendez  Sent: 5/21/2020  12:15 PM EDT  To: MD Dr. Leo Vazquez,    PT CALLED AND WAS LAST SEEN BY Dr. Kramer ON 4/8/20. HE IS REQUESTING TO CHANGE PROVIDERS AND CONTINUE HIS CARE WITH DR. WELDON BECAUSE HE STATES HE IS HAVING MEMORY ISSUES. HE STATES HE HAD AN MRI AND A NERVE STUDY DONE YESTERDAY AND HE SAID HIS MEMORY IS DECLINING. PLEASE ADVISE         Dr. Kramer said hew was fine with the pt coming to you. Please advise.

## 2020-05-21 NOTE — TELEPHONE ENCOUNTER
Spoke with pt and advised. He verbalized understanding. States he is still feeling horrible. He actually thinks he may have dementia and went to [facility]. They did an MRI on his head. He has the disk. Per [facility] report will be printed from computer for your review. Patient thanked our office and will keep appt on 06/08/20.

## 2020-05-21 NOTE — TELEPHONE ENCOUNTER
I'm sorry but I don't think I'm likely to be able to help. I would recommend he pursue further workup with [facility]. I'm sorry.

## 2020-05-21 NOTE — TELEPHONE ENCOUNTER
Called pt to relay message from Dr. Bailey that after reviewing pt's records Dr. Bailey does not feel that he is the best person for the pt to see. Dr. Bailey recommended pt go to  for further memory evaluation. Pt verbalized understanding.

## 2020-05-21 NOTE — TELEPHONE ENCOUNTER
He wanted to change provider and wanted to be seen by Dr. Bailey.  Can you ask him and confirm?.  I am okay if he wants to switch if okay with Dr. Bailey.

## 2020-05-21 NOTE — TELEPHONE ENCOUNTER
Dr. Bailey please advise if ok to switch to you for memory care? Also, would you review the MRI if we can print from the [facility] portal?

## 2020-06-02 ENCOUNTER — CONSULT (OUTPATIENT)
Dept: SLEEP MEDICINE | Facility: HOSPITAL | Age: 60
End: 2020-06-02

## 2020-06-02 VITALS
OXYGEN SATURATION: 97 % | SYSTOLIC BLOOD PRESSURE: 139 MMHG | HEIGHT: 72 IN | BODY MASS INDEX: 24.84 KG/M2 | WEIGHT: 183.42 LBS | HEART RATE: 111 BPM | DIASTOLIC BLOOD PRESSURE: 87 MMHG

## 2020-06-02 DIAGNOSIS — G47.00 INSOMNIA, UNSPECIFIED TYPE: ICD-10-CM

## 2020-06-02 DIAGNOSIS — Z99.89 OSA ON CPAP: Primary | ICD-10-CM

## 2020-06-02 DIAGNOSIS — G47.33 OSA ON CPAP: Primary | ICD-10-CM

## 2020-06-02 PROCEDURE — 99204 OFFICE O/P NEW MOD 45 MIN: CPT | Performed by: INTERNAL MEDICINE

## 2020-06-02 RX ORDER — CHOLECALCIFEROL (VITAMIN D3) 125 MCG
5 CAPSULE ORAL
COMMUNITY
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
## 2020-06-03 ENCOUNTER — TELEPHONE (OUTPATIENT)
Dept: SLEEP MEDICINE | Facility: HOSPITAL | Age: 60
End: 2020-06-03

## 2020-06-03 DIAGNOSIS — Z99.89 OSA ON CPAP: Primary | ICD-10-CM

## 2020-06-03 DIAGNOSIS — G47.33 OSA ON CPAP: Primary | ICD-10-CM
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
## 2020-06-08 ENCOUNTER — OFFICE VISIT (OUTPATIENT)
Dept: NEUROLOGY | Facility: CLINIC | Age: 60
End: 2020-06-08

## 2020-06-08 VITALS
BODY MASS INDEX: 24.65 KG/M2 | DIASTOLIC BLOOD PRESSURE: 90 MMHG | HEIGHT: 72 IN | OXYGEN SATURATION: 96 % | HEART RATE: 95 BPM | WEIGHT: 182 LBS | SYSTOLIC BLOOD PRESSURE: 120 MMHG

## 2020-06-08 DIAGNOSIS — R20.2 PARESTHESIAS: Primary | ICD-10-CM

## 2020-06-08 DIAGNOSIS — G47.09 OTHER INSOMNIA: ICD-10-CM

## 2020-06-08 DIAGNOSIS — R53.1 WEAKNESS: ICD-10-CM

## 2020-06-08 PROCEDURE — 99214 OFFICE O/P EST MOD 30 MIN: CPT | Performed by: PSYCHIATRY & NEUROLOGY

## 2020-06-08 RX ORDER — ZOLPIDEM TARTRATE 5 MG/1
5 TABLET ORAL
COMMUNITY

## 2020-06-08 NOTE — PROGRESS NOTES
Subjective:    CC: Alo Yousif is in clinic today for follow up for bilateral upper extremity paresthesias, generalized weakness.    HPI:  Initial visit:- 4/6/2020: This visit was done through video.  Patient is a 59-year-old male with past medical history of hypertension, DVT/PE started having bilateral hand and arm numbness associated with mild weakness and tremors of both hands back in January 2020.  He reports that prior to starting the symptoms, he was eating elderberry for approximately 6 days in his breakfast.  Initially it was thought to be related to the consumption of elderberry and he was instructed to go to ER for further evaluation.  While in ER, he underwent basic lab work which was essentially unremarkable.  He was treated with IV hydration which improved his symptoms and then was eventually discharged home with instruction to follow-up with outpatient neurology.  He reports that since the ER visit, symptoms have become less intense and less severe but it is still present.  He reports that he has stayed in an apartment for about 20 years where he was working from home and he would spend most of his time in his bedroom.  He is concerned that it may be related to some environmental exposure causing this ongoing symptoms.  He denies seeing any visible mold in his apartment.  Currently he has moved out of his apartment and is living with his son.  He denies any weakness in both his hands.  He reports that intermittently, he has experienced some tingling, numbness in both his feet as well.  He has been tested for diabetes and it was negative.  He denies any low back pain or neck pain.  He reports that he has been taking magnesium for some time now and it has helped improve some of his symptoms.  He also takes vitamin B12 1000 mcg daily.    6/8/2020: He is in clinic for regular follow-up.  Initial visit was done over the phone.  Since initial visit, he has had EMG/nerve conduction study for evaluation  of sudden onset of bilateral hand paresthesias.  It did not reveal any evidence of generalized neuropathy or myopathy.  It showed mild right ulnar neuropathy.  He has a detailed blood work-up done for neuropathy and essentially everything has been unremarkable.  He reports that 2 months after the onset of symptoms, he has been having a lot of problems with sleep, he is unable to sleep at night, extremely anxious because of his symptoms and feels somewhat depressed.  He also is reporting difficulty with memory, fogginess and feeling of generalized fatigue and weakness.  He also underwent MRI brain at  after I saw him and it showed only mild nonspecific white matter changes.    The following portions of the patient's history were reviewed and updated as of 06/08/2020: allergies, social history and problem list.       Current Outpatient Medications:   •  melatonin 5 MG tablet tablet, Take 5 mg by mouth., Disp: , Rfl:   •  zolpidem (Ambien) 5 MG tablet, Take 5 mg by mouth every night at bedtime., Disp: , Rfl:   •  apixaban (ELIQUIS) 5 MG tablet tablet, Take  by mouth 2 (Two) Times a Day. PT ELECTED TO STOP ELIQUIS 7 DAYS AGO WITHOUT CONSULTING PHYSICIAN, Disp: , Rfl:   •  metoprolol succinate XL (TOPROL-XL) 50 MG 24 hr tablet, Take 1 tablet by mouth Daily., Disp: 30 tablet, Rfl: 0   Past Medical History:   Diagnosis Date   • Difficulty walking    • DVT (deep venous thrombosis) (CMS/[facility])    • Hypertension    • Kidney stone    • PE (pulmonary thromboembolism) (CMS/[facility])       Past Surgical History:   Procedure Laterality Date   • BLADDER SURGERY     • KNEE SURGERY Left    • TONSILLECTOMY        Family History   Problem Relation Age of Onset   • Hypertension Father    • COPD Father         Review of Systems   Neurological: Positive for tremors, weakness (Legs, arms) and numbness.   Psychiatric/Behavioral: Positive for sleep disturbance and stress. The patient is nervous/anxious.    All other systems reviewed and are  "negative.    Objective:    /90   Pulse 95   Ht 182.9 cm (72\")   Wt 82.6 kg (182 lb)   SpO2 96%   BMI 24.68 kg/m²     Neurology Exam:  General apperance: NAD.     Mental status: Alert, awake and oriented to time place and person.    Recent and Remote memory: Can recall 3/3 objects at 5 minutes. Can recall historical events.     Attention span and Concentration: Serial 7s: Normal.     Fund of knowledge:  Normal.     Language and Speech: No aphasia or dysarthria.    Naming , Repitition and Comprehension:  Can name objects, repeat a sentence and follow commands. Speech is clear and fluent with good repetition, comprehension, and naming.    CN II to XII: Intact.    Opthalmoscopic Exam: No papilledema.    Motor:  Right UE muscle strength 5/5. Normal tone.     Left UE muscle strength 5/5. Normal tone.      Right LE muscle strength 4/5. Normal tone.  Giveaway weakness noted.    Left LE muscle strength 5/5. Normal tone.      Sensory: Normal light touch, vibration and pinprick sensation bilaterally.    DTRs: 1+ bilaterally both upper and lower extremities.    Babinski: Negative bilaterally.    Co-ordination: Normal finger-to-nose, heel to shin B/L.    Rhomberg: Negative.    Gait: Normal.    Cardiovascular: Regular rate and rhythm without murmur, gallop or rub.    Assessment and Plan:  1. Paresthesias  2. Weakness  3. Other insomnia  -Patient with sudden onset of paresthesias in both in hands after he ate elderberry for 1 week back in March.  Initially had tremors associated with it but it has improved.  He reports that since onset of symptoms, he has had significant increase in anxiety, extremely frequently with sleep.  He is also reporting difficulty with memory, fogginess and difficulty with concentration because of lack of sleep.  Currently he is taking Ambien 5 mg at bedtime which helps him sleep 3 to 4 hours.  He is also taking melatonin 1.5 mg nightly.  I have advised him to increase the dose of melatonin to " 10 mg 1 hour prior to his bedtime.  Other options that can be tried were discussed with him including trazodone, Restoril, doxepin and Lunesta in future.  So far I have told him that we do not have a good explanation of his ongoing symptoms.  EMG/nerve conduction study done at [facility] was quite reassuring without any evidence of neuropathy.  He is concerned about environmental exposures/gas exposure leading to the symptoms.  I am going to repeat EMG/nerve conduction study at  to compare it with the first 1.  Most of his ongoing symptoms are caused by lack of sleep and increased amount of anxiety needs to be controlled optimally.  I have told him to call office if he does not sleep better with higher dose of melatonin and in that case, either Restoril or doxepin can be tried.  I will see him back in clinic in 6 to 8 weeks for follow-up.       I spent 25 minutes face to face with the patient and spent more than 50% of this time  in management, instructions and education regarding above mentioned diagnosis and also on counseling and discussing about taking medication regularly, possible side effects with medication use, importance of good sleep hygiene, good hydration and regular exercise.    No follow-ups on file.

## 2020-06-09 ENCOUNTER — TELEPHONE (OUTPATIENT)
Dept: NEUROLOGY | Facility: CLINIC | Age: 60
End: 2020-06-09

## 2020-06-09 NOTE — TELEPHONE ENCOUNTER
Yes, trazodone can cause penile erection as a side effect.  However, with continued use, it should subside.  If it helped  him then continue with trazodone for now.  Thanks.

## 2020-06-09 NOTE — TELEPHONE ENCOUNTER
PT CALLED IN AND SAID HE TOOK 50MG OF TRAZODONE LAST NIGHT AFTER SEEING DR. WASHBURN AND SAID DR. WASHBURN RECOMMENDED HE TAKE IT. HE SAID IT WORKED GREAT EXCEPT IT GAVE HIM AN ERECTION WHICH WAS CONCERNING TO HIM AND WONDERS IF THAT COULD BE A SIDE EFFECT. PLEASE ADVISE.      BEST CALL BACK- [phone]

## 2020-06-09 NOTE — TELEPHONE ENCOUNTER
Relayed this to Alo who stated understanding and as it has helped will continue and keep us updated regarding the side effects. Thanks!

## 2020-06-11 ENCOUNTER — TELEPHONE (OUTPATIENT)
Dept: NEUROLOGY | Facility: CLINIC | Age: 60
End: 2020-06-11

## 2020-06-11 NOTE — TELEPHONE ENCOUNTER
TRAZADONE IS NOT BEING EFFECTIVE AND PATIENT WOULD LIKE TO TRY THE TOMAZAPAM AS DISCUSSED    PT USES ADONAY AT [facility]    PLEASE CALL PT TO UPDATE [phone]    PATIENT ALSO WOULD LIKE TO GET THE DISC OF THE CT THAT WAS DELIVERED TO OFFICE, HE NEEDS IT FOR ANOTHER APPT

## 2020-06-11 NOTE — TELEPHONE ENCOUNTER
Left a detailed vm for Alo relaying 's recommendation and requesting a call back regarding his decision. Office # given. Thanks!

## 2020-06-15 ENCOUNTER — TELEPHONE (OUTPATIENT)
Dept: NEUROLOGY | Facility: CLINIC | Age: 60
End: 2020-06-15

## 2020-06-15 NOTE — TELEPHONE ENCOUNTER
CAM FROM DR CLAUDIA BRIZUELA'S OFFICE CALLED TO REQUEST OFFICE VISIT NOTE FROM 6-8-20.    PH: [phone]  FAX: [phone]

## 2020-06-15 NOTE — TELEPHONE ENCOUNTER
Patient called about the disc of THE MRI AND WANTED TO KNOW IF ANYTHING WAS FOUND ON IT AND HE NEEDS TO  THE DISC TOMORROW FOR ANOTHER APPT.    PLEASE CALL [phone]

## 2020-06-15 NOTE — TELEPHONE ENCOUNTER
Spoke with pt and informed him that  was out of the office due to a medical emergency. Advised that I have his MRI disc, so he could come pick it up for his other appointment and return it for  to review. Pt acknowledged understanding and will  disc today from front window.

## 2020-06-26 ENCOUNTER — TELEPHONE (OUTPATIENT)
Dept: NEUROLOGY | Facility: CLINIC | Age: 60
End: 2020-06-26

## 2020-06-26 DIAGNOSIS — R53.1 WEAKNESS: ICD-10-CM

## 2020-06-26 DIAGNOSIS — R20.2 PARESTHESIAS: Primary | ICD-10-CM

## 2020-06-26 NOTE — TELEPHONE ENCOUNTER
I think there was some miscommunication.  I am sending new orders to  neuromuscular department.  It would be better to have it done at  to compare it with the one that was done here at [facility].

## 2020-06-26 NOTE — TELEPHONE ENCOUNTER
PT CALLED BACK AND HAS A QUESTION ABOUT THE EMG, DR WASHBURN HAD MENTIONED THAT HE WANT THE PT TO HAVE THIS EMG DONE AT ANOTHER PLACE BUT WHEN HE GOT THE CALL IT'S THE SAME PLACE WHERE HE HAD IT DONE BEFORE. PT DOESN'T MIND TO HAVE IT DONE AT THE SAME PLACE AGAIN BUT WANTS DR. WASHBURN TO KNOW. IF HE CAN BE CALLED BACK AND LET HIM KNOW WHAT DR. WASHBURN WANTS HIM TO DO HAVE THE EMG DONE AT THE SAME PLACE OR NOT. PLEASE CALL HIM -[phone]

## 2020-06-26 NOTE — TELEPHONE ENCOUNTER
PT SATES THAT RADIOLOGY STATES THEY NEED A NEUROLOGY REPORT ([facility] AND PHYSICAL) TO DO HIS SPINAL MRI ON 6-30-20. HE STATES HE CAN  IF ABLE TO.     PLEASE ADVISE    [phone]  SUSANA

## 2020-06-29 ENCOUNTER — TELEPHONE (OUTPATIENT)
Dept: NEUROLOGY | Facility: CLINIC | Age: 60
End: 2020-06-29

## 2020-06-29 NOTE — TELEPHONE ENCOUNTER
Pt called and requested notes for MRI that he said is scheduled somewhere in [place], [place]. Inquired where pt is needing notes faxed and pt stated that he will call provider and have them call office to provide fax number. Informed pt that he is schedule for NVC study at  7/31 @ 8:30. Pt stated verbal understanding and faxed notes, order, demographics, and insurance ([phone]). Thanks.

## 2020-06-29 NOTE — TELEPHONE ENCOUNTER
Provider: DR. WASHBURN   Caller:GINGER WITH [facility] DR. RICHMOND       Relationship to Patient: SUSANA GAMZE  : 1960          Reason for Call: GINGER WITH [facility] DR. RICHMOND NEEDING PT RECORDS FAXED TO THEM [phone]. IF YOU HAVE ANY QUESTIONS YOU CAN CALL HER BACK AT  [phone]. PT HAS AN APPT FIRST THING IN THE MORNING, PLEASE SEND SOON AS POSSIBLE

## 2020-07-07 ENCOUNTER — TELEPHONE (OUTPATIENT)
Dept: NEUROLOGY | Facility: CLINIC | Age: 60
End: 2020-07-07

## 2020-07-07 NOTE — TELEPHONE ENCOUNTER
GINGER AT  [facility]  [phone]    PT SIGNED A RELEASE OF INFORMATION AND THEY HAVE ASKED FOR HIS RECORDS    HE IS VERY ILL AND THEY NEED THESE RECORDS TODAY

## 2020-07-29 ENCOUNTER — TELEPHONE (OUTPATIENT)
Dept: NEUROLOGY | Facility: CLINIC | Age: 60
End: 2020-07-29

## 2020-08-03 ENCOUNTER — TELEPHONE (OUTPATIENT)
Dept: SLEEP MEDICINE | Facility: HOSPITAL | Age: 60
End: 2020-08-03

## 2020-08-03 DIAGNOSIS — G47.33 OSA (OBSTRUCTIVE SLEEP APNEA): Primary | ICD-10-CM

## 2020-08-03 NOTE — TELEPHONE ENCOUNTER
Pt called today to inquire why he hasn't heard anything about another home sleep study.    After reviewing the chart, I was able to see that Dr. Reyes ordered new CPAP equipment and the patient admitted to being able to get new equipment.  I explained to him that with most [employer] insurance plans, they don't require repeat testing unless there has been a change in clinical condition (such as weight loss) and by reading the notes it looked like the patient was happy with his CPAP and just wanted new equipment.  He understood where the physician was coming from but in his particular case, he said that he has in fact lost almost 40 lbs going from 215 lbs to 180 lbs and would like repeat testing.      I let him know I would communicate this to Dr. Reyes and if he ordered a new HST that we could then move forward with scheduling this test.  He was agreeable with this plan.

## 2020-09-03 ENCOUNTER — OFFICE VISIT (OUTPATIENT)
Dept: NEUROLOGY | Facility: CLINIC | Age: 60
End: 2020-09-03

## 2020-09-03 VITALS
DIASTOLIC BLOOD PRESSURE: 68 MMHG | BODY MASS INDEX: 24.14 KG/M2 | WEIGHT: 178.2 LBS | HEIGHT: 72 IN | OXYGEN SATURATION: 99 % | SYSTOLIC BLOOD PRESSURE: 122 MMHG | HEART RATE: 97 BPM | TEMPERATURE: 97.5 F

## 2020-09-03 DIAGNOSIS — F09 COGNITIVE AND NEUROBEHAVIORAL DYSFUNCTION: ICD-10-CM

## 2020-09-03 DIAGNOSIS — R20.2 PARESTHESIAS: Primary | ICD-10-CM

## 2020-09-03 PROCEDURE — 99214 OFFICE O/P EST MOD 30 MIN: CPT | Performed by: PSYCHIATRY & NEUROLOGY

## 2020-09-03 NOTE — PROGRESS NOTES
Subjective:    CC: Alo Yousif is in clinic today for follow up for      HPI:  Initial visit:- 4/6/2020: This visit was done through video.  Patient is a 59-year-old male with past medical history of hypertension, DVT/PE started having bilateral hand and arm numbness associated with mild weakness and tremors of both hands back in January 2020.  He reports that prior to starting the symptoms, he was eating elderberry for approximately 6 days in his breakfast.  Initially it was thought to be related to the consumption of elderberry and he was instructed to go to ER for further evaluation.  While in ER, he underwent basic lab work which was essentially unremarkable.  He was treated with IV hydration which improved his symptoms and then was eventually discharged home with instruction to follow-up with outpatient neurology.  He reports that since the ER visit, symptoms have become less intense and less severe but it is still present.  He reports that he has stayed in an apartment for about 20 years where he was working from home and he would spend most of his time in his bedroom.  He is concerned that it may be related to some environmental exposure causing this ongoing symptoms.  He denies seeing any visible mold in his apartment.  Currently he has moved out of his apartment and is living with his son.  He denies any weakness in both his hands.  He reports that intermittently, he has experienced some tingling, numbness in both his feet as well.  He has been tested for diabetes and it was negative.  He denies any low back pain or neck pain.  He reports that he has been taking magnesium for some time now and it has helped improve some of his symptoms.  He also takes vitamin B12 1000 mcg daily.    6/8/2020: He is in clinic for regular follow-up.  Initial visit was done over the phone.  Since initial visit, he has had EMG/nerve conduction study for evaluation of sudden onset of bilateral hand paresthesias.  It did not  reveal any evidence of generalized neuropathy or myopathy.  It showed mild right ulnar neuropathy.  He has a detailed blood work-up done for neuropathy and essentially everything has been unremarkable.  He reports that 2 months after the onset of symptoms, he has been having a lot of problems with sleep, he is unable to sleep at night, extremely anxious because of his symptoms and feels somewhat depressed.  He also is reporting difficulty with memory, fogginess and feeling of generalized fatigue and weakness.  He also underwent MRI brain at  after I saw him and it showed only mild nonspecific white matter changes.    9/3/2020: He is in clinic for regular follow-up.  Since his last visit in June 2020, he has had second EMG/nerve conduction study performed at .  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  He reports that in between, he has had lumbar puncture performed which showed mildly elevated CSF with all other CSF parameters negative.  He reports that he was admitted to the hospital in [place] and received IV Solu-Medrol 1 g daily for 3 days.  Following this, he reports that his sleep improved significantly and now is sleeping on an average 6 hours a night.  However, he reports that he has had continuous decline in his cognitive and executive functioning.  He reports difficulty in comprehension after reading, problems with executive functioning and also reports outburst of significant behavioral changes where he starts to cuss for several minutes.  Ports that he has never done this before.  This happens once or twice in a week.  As far as his mood is concerned, he reports that because of the ongoing symptoms she has been anxious and may be mildly depressed.  Also underwent MRI of cervical spine and thoracic spine which I reviewed personally and did not reveal any changes within the spinal cord suggestive of MS.  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    The following portions of the patient's history were reviewed and updated as of 09/03/2020: allergies, social history and problem list.       Current Outpatient Medications:   •  apixaban (ELIQUIS) 5 MG tablet tablet, Take  by mouth 2 (Two) Times a Day. PT ELECTED TO STOP ELIQUIS 7 DAYS AGO WITHOUT CONSULTING PHYSICIAN, Disp: , Rfl:   •  melatonin 5 MG tablet tablet, Take 5 mg by mouth., Disp: , Rfl:   •  metoprolol succinate XL (TOPROL-XL) 50 MG 24 hr tablet, Take 1 tablet by mouth Daily., Disp: 30 tablet, Rfl: 0  •  zolpidem (Ambien) 5 MG tablet, Take 5 mg by mouth every night at bedtime., Disp: , Rfl:    Past Medical History:   Diagnosis Date   • Difficulty walking    • DVT (deep venous thrombosis) (CMS/[facility])    • Hypertension    • Kidney stone    • PE (pulmonary thromboembolism) (CMS/[facility])       Past Surgical History:   Procedure Laterality Date   • BLADDER SURGERY     • KNEE SURGERY Left    • TONSILLECTOMY        Family History   Problem Relation Age of Onset   • Hypertension Father    • COPD Father         Review of Systems   Constitutional: Positive for activity change.   Musculoskeletal: Positive for gait problem.   Neurological: Positive for tremors, weakness and numbness.   Psychiatric/Behavioral: Positive for agitation and decreased concentration.     Objective:    /68   Pulse 97   Temp 97.5 °F (36.4 °C) (Temporal)   Ht 182.9 cm (72.01\")   Wt 80.8 kg (178 lb 3.2 oz)   SpO2 99%   BMI 24.16 kg/m²     Neurology Exam:  General apperance: NAD.     Mental status: Alert, awake and oriented to time place and person.    Recent and Remote memory: Can recall 3/3 objects at 5 minutes. Can recall historical events.     Attention span and Concentration: Serial 7s: Normal.     Fund of knowledge:  Normal.     Language and Speech: No aphasia or dysarthria.    Naming , Repitition " and Comprehension:  Can name objects, repeat a sentence and follow commands. Speech is clear and fluent with good repetition, comprehension, and naming.    CN II to XII: Intact.    Opthalmoscopic Exam: No papilledema.    Motor:  Right UE muscle strength 5/5. Normal tone.     Left UE muscle strength 5/5. Normal tone.      Right LE muscle strength5/5. Normal tone.     Left LE muscle strength 5/5. Normal tone.      Sensory: Normal light touch, vibration and pinprick sensation bilaterally.    DTRs: 2+ bilaterally.    Babinski: Negative bilaterally.    Co-ordination: Normal finger-to-nose, heel to shin B/L.    Rhomberg: Negative.    Gait: Normal.    Cardiovascular: Regular rate and rhythm without murmur, gallop or rub.    Assessment and Plan:  1. Paresthesias  2. Cognitive and neurobehavioral dysfunction  -Patient with history of constellation of symptoms with started 1 week after he ate elderberry and breakfast.  Unknown etiology.  MRI brain, cervical and thoracic spine are essentially normal and cannot explain the symptoms that he is experiencing.  He does have mild peripheral neuropathy which could explain the paresthesias that he is having however lately he has started experiencing problems with her cognition and significant behavioral changes.  I am going to refer him for detailed neuropsych testing.  I am unsure why he received IV Solu-Medrol 1 g for 3 days.  I will be obtaining detailed reports of his hospitalization.  Based on the report of neuropsych testing, further treatment options will be discussed with him.  He asked for leave from work for about a week on today's visit and will be talking to his primary care physician about FMLA.        I spent 25 minutes face to face with the patient and spent more than 50% of this time  in management, instructions and education regarding above mentioned diagnosis and also on counseling and discussing about taking medication regularly, possible side effects with  medication use, importance of good sleep hygiene, good hydration and regular exercise.    Return in about 8 weeks (around 10/29/2020).